# Patient Record
Sex: FEMALE | Race: WHITE | NOT HISPANIC OR LATINO | URBAN - METROPOLITAN AREA
[De-identification: names, ages, dates, MRNs, and addresses within clinical notes are randomized per-mention and may not be internally consistent; named-entity substitution may affect disease eponyms.]

---

## 2023-02-22 ENCOUNTER — APPOINTMENT (OUTPATIENT)
Dept: URBAN - METROPOLITAN AREA CLINIC 224 | Age: 38
Setting detail: DERMATOLOGY
End: 2023-02-23

## 2023-02-22 DIAGNOSIS — L21.8 OTHER SEBORRHEIC DERMATITIS: ICD-10-CM

## 2023-02-22 DIAGNOSIS — R23.2 FLUSHING: ICD-10-CM

## 2023-02-22 PROCEDURE — 99203 OFFICE O/P NEW LOW 30 MIN: CPT

## 2023-02-22 PROCEDURE — OTHER COUNSELING: OTHER

## 2023-02-22 PROCEDURE — OTHER PRESCRIPTION MEDICATION MANAGEMENT: OTHER

## 2023-02-22 PROCEDURE — OTHER TREATMENT REGIMEN: OTHER

## 2023-02-22 PROCEDURE — OTHER MIPS QUALITY: OTHER

## 2023-02-22 PROCEDURE — OTHER PRESCRIPTION: OTHER

## 2023-02-22 RX ORDER — HYDROCORTISONE 25 MG/G
OINTMENT TOPICAL
Qty: 28.35 | Refills: 1 | Status: ERX | COMMUNITY
Start: 2023-02-22

## 2023-02-22 ASSESSMENT — LOCATION DETAILED DESCRIPTION DERM
LOCATION DETAILED: LEFT LATERAL SUPERIOR EYELID
LOCATION DETAILED: LEFT INFERIOR CENTRAL MALAR CHEEK
LOCATION DETAILED: RIGHT INFERIOR CENTRAL MALAR CHEEK
LOCATION DETAILED: RIGHT LATERAL SUPERIOR EYELID
LOCATION DETAILED: GLABELLA

## 2023-02-22 ASSESSMENT — LOCATION ZONE DERM
LOCATION ZONE: FACE
LOCATION ZONE: EYELID

## 2023-02-22 ASSESSMENT — LOCATION SIMPLE DESCRIPTION DERM
LOCATION SIMPLE: RIGHT SUPERIOR EYELID
LOCATION SIMPLE: RIGHT CHEEK
LOCATION SIMPLE: LEFT CHEEK
LOCATION SIMPLE: LEFT SUPERIOR EYELID
LOCATION SIMPLE: GLABELLA

## 2023-02-22 NOTE — PROCEDURE: PRESCRIPTION MEDICATION MANAGEMENT
Initiate Treatment: hydrocortisone 2.5 % topical ointment twice daily.  Cycle 2 weeks on, 2 weeks off.  Apply around eyes with Qtip
Render In Strict Bullet Format?: No
Detail Level: Zone

## 2023-02-22 NOTE — HPI: RASH
What Type Of Note Output Would You Prefer (Optional)?: Standard Output
Is The Patient Presenting As Previously Scheduled?: Yes
How Severe Is Your Rash?: severe
Is This A New Presentation, Or A Follow-Up?: Rash
Additional History: Patient has had rash since August.  She has been having intermittent facial flushing of the cheeks.  She has has also had hair loss on the scalp and eyelashes for the last few years.  \\Carol has been see for the facial rash by the ER and her PCP.  She has been treated with prednisone which seems to make her face worse.  \\Carol had labs drawn by her PCP and was told labs were borderline for lupus.  She has an upcoming appointment with rheumatology in March.

## 2023-02-22 NOTE — PROCEDURE: TREATMENT REGIMEN
Plan: Patient had labs done by PCP suggestive of lupus and also states  had abnormal kidney function. Seeing rheumatologist in a few weeks to have a work up for lupus.  Patient states her facial flushing comes and goes but never fully resolves.  When she has a flare it can last for a few days \\n\\nPatient also has a history of alopecia.  She reports hair loss on the scalp, eyebrows and eyelashes.  her eyelashes have since grown back.  Discussed hair loss can also be associated with lupus. \\n Patient states the hair loss started when she was pregnant (was pregnant for back to back years) and told the hair loss was hormonal.  She states she has had her hormone levels check and were normal\\n\\nThere can be other causes of facial flushing including rosacea, neurologic, or endocrine.  Defer to rheumatology at this time to complete work up for SLE first.  Further dermatology work up pending rheumatology visit
Detail Level: Zone
Discontinue Regimen: OTC exfoliating washes given by  - patient brought products she is currently using to be reviewed.  She is using more than 1 exfoliator
Samples Given: Cetaphil, CeraVe